# Patient Record
Sex: FEMALE | Race: WHITE | NOT HISPANIC OR LATINO | Employment: OTHER | URBAN - METROPOLITAN AREA
[De-identification: names, ages, dates, MRNs, and addresses within clinical notes are randomized per-mention and may not be internally consistent; named-entity substitution may affect disease eponyms.]

---

## 2019-10-03 NOTE — PATIENT DISCUSSION
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 This can distort vision. The fluid leakage comes from a layer of tissue under the retina, called the choroid. The layer of cells between retina and choroid is called the retinal pigment epithelium (RPE). When RPE does not work as it should, fluid builds up under the retina or the RPE resulting in a small detachment and visual distortion.

## 2019-11-14 NOTE — PATIENT DISCUSSION
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

## 2020-08-20 ENCOUNTER — NEW PATIENT COMPREHENSIVE (OUTPATIENT)
Dept: URBAN - METROPOLITAN AREA CLINIC 32 | Facility: CLINIC | Age: 85
End: 2020-08-20

## 2020-08-20 DIAGNOSIS — H33.011: ICD-10-CM

## 2020-08-20 DIAGNOSIS — H40.023: ICD-10-CM

## 2020-08-20 PROCEDURE — 92004 COMPRE OPH EXAM NEW PT 1/>: CPT

## 2020-08-20 PROCEDURE — 76514 ECHO EXAM OF EYE THICKNESS: CPT

## 2020-08-20 PROCEDURE — 92133 CPTRZD OPH DX IMG PST SGM ON: CPT

## 2020-08-20 PROCEDURE — 92015 DETERMINE REFRACTIVE STATE: CPT

## 2020-08-20 ASSESSMENT — KERATOMETRY
OD_K1POWER_DIOPTERS: 43.25
OS_K2POWER_DIOPTERS: 44.25
OD_K2POWER_DIOPTERS: 44.75
OD_AXISANGLE2_DEGREES: 3
OS_AXISANGLE_DEGREES: 65
OS_K1POWER_DIOPTERS: 43.5
OS_AXISANGLE2_DEGREES: 155
OD_AXISANGLE_DEGREES: 93

## 2020-08-20 ASSESSMENT — VISUAL ACUITY
OS_SC: 20/40
OS_PH: 20/20-2
OD_SC: 20/70
OD_PH: 20/25-1
OS_SC: J2
OD_SC: J1-1

## 2020-08-20 ASSESSMENT — PACHYMETRY
OS_CT_UM: 516
OD_CT_UM: 516

## 2020-08-20 ASSESSMENT — TONOMETRY
OS_IOP_MMHG: 15
OD_IOP_MMHG: 19

## 2020-09-15 NOTE — PATIENT DISCUSSION
We reviewed the signs and symptoms of retinal tear/retinal detachment and the importance of prompt evaluation should there be increasing floaters, new flashing lights, or decreasing peripheral vision in either eye at any time. Walk in [employer]

## 2020-10-14 ENCOUNTER — EST. PATIENT EMERGENCY (OUTPATIENT)
Dept: URBAN - METROPOLITAN AREA CLINIC 32 | Facility: CLINIC | Age: 85
End: 2020-10-14

## 2020-10-14 DIAGNOSIS — H33.011: ICD-10-CM

## 2020-10-14 DIAGNOSIS — H40.023: ICD-10-CM

## 2020-10-14 PROCEDURE — 92012 INTRM OPH EXAM EST PATIENT: CPT

## 2020-10-14 ASSESSMENT — TONOMETRY
OS_IOP_MMHG: 21
OD_IOP_MMHG: 16

## 2020-10-14 ASSESSMENT — KERATOMETRY
OS_K2POWER_DIOPTERS: 44.25
OD_AXISANGLE2_DEGREES: 3
OD_K1POWER_DIOPTERS: 43.25
OD_AXISANGLE_DEGREES: 93
OS_AXISANGLE2_DEGREES: 155
OS_AXISANGLE_DEGREES: 65
OD_K2POWER_DIOPTERS: 44.75
OS_K1POWER_DIOPTERS: 43.5

## 2020-10-14 ASSESSMENT — VISUAL ACUITY
OS_SC: 20/25-2
OD_SC: 20/80-2

## 2020-10-15 ENCOUNTER — ESTABLISHED PATIENT (OUTPATIENT)
Dept: URBAN - METROPOLITAN AREA CLINIC 32 | Facility: CLINIC | Age: 85
End: 2020-10-15

## 2020-10-15 DIAGNOSIS — H52.203: ICD-10-CM

## 2020-10-15 DIAGNOSIS — H33.011: ICD-10-CM

## 2020-10-15 DIAGNOSIS — H52.4: ICD-10-CM

## 2020-10-15 DIAGNOSIS — H52.03: ICD-10-CM

## 2020-10-15 PROCEDURE — 92015 DETERMINE REFRACTIVE STATE: CPT

## 2020-10-15 PROCEDURE — 92012 INTRM OPH EXAM EST PATIENT: CPT

## 2020-10-15 PROCEDURE — 92134 CPTRZ OPH DX IMG PST SGM RTA: CPT

## 2020-10-15 ASSESSMENT — KERATOMETRY
OS_AXISANGLE2_DEGREES: 155
OS_K2POWER_DIOPTERS: 44.25
OS_K1POWER_DIOPTERS: 43.5
OD_AXISANGLE_DEGREES: 93
OD_AXISANGLE2_DEGREES: 3
OD_K1POWER_DIOPTERS: 43.25
OS_AXISANGLE_DEGREES: 65
OD_K2POWER_DIOPTERS: 44.75

## 2020-10-15 ASSESSMENT — TONOMETRY
OD_IOP_MMHG: 16
OS_IOP_MMHG: 17

## 2020-10-15 ASSESSMENT — VISUAL ACUITY
OD_SC: 20/60
OS_SC: 20/25-2

## 2020-11-02 NOTE — PATIENT DISCUSSION
COVID-19 Pre-surgery screenin. Do you have an undiagnosed respiratory illness or symptoms such as coughing or sneezing? No   a. Onset of Sx No  b. Acute vs. chronic respiratory illness No    2. Do you have an unexplained fever greater than 100.4 degrees Fahrenheit or 38 degrees Celsius?     No     3. Have you had direct exposure to a patient who tested positive for Covid-19?    No     4. Have you had any loss of your sense of taste or smell? Have you had N/V or sore throat? No    Patient has been informed of visitor policy and asked to wear a mask upon entering the hospital   Yes        Pt edu no scarring seen on exam from any previous  so likely this is first occurrence. Risk factors include the use of steroids, sleep disturbances, increased Stress etc. Pt states he is currently receiving Steroid injections for his back, he is also an avid  and does get stressed out from [place] and stays up late. Edu pt this will likely improve on its own, recommend getting adequate sleep, reducing stress and reduce the use of Steroids/nasal sprays. Observation recommended at this point. Will follow up in 6 weeks. Advised pt to call with any vision changes.

## 2020-12-07 ENCOUNTER — EST. PATIENT EMERGENCY (OUTPATIENT)
Dept: URBAN - METROPOLITAN AREA CLINIC 32 | Facility: CLINIC | Age: 85
End: 2020-12-07

## 2020-12-07 DIAGNOSIS — H02.403: ICD-10-CM

## 2020-12-07 DIAGNOSIS — H02.835: ICD-10-CM

## 2020-12-07 DIAGNOSIS — H02.832: ICD-10-CM

## 2020-12-07 PROCEDURE — 92014 COMPRE OPH EXAM EST PT 1/>: CPT

## 2020-12-07 ASSESSMENT — KERATOMETRY
OD_K2POWER_DIOPTERS: 44.75
OD_AXISANGLE_DEGREES: 93
OD_K1POWER_DIOPTERS: 43.25
OS_AXISANGLE2_DEGREES: 155
OS_K2POWER_DIOPTERS: 44.25
OD_AXISANGLE2_DEGREES: 3
OS_K1POWER_DIOPTERS: 43.5
OS_AXISANGLE_DEGREES: 65

## 2020-12-07 ASSESSMENT — VISUAL ACUITY
OS_SC: 20/25
OD_SC: 20/400

## 2020-12-07 ASSESSMENT — TONOMETRY
OS_IOP_MMHG: 18
OD_IOP_MMHG: 14

## 2020-12-08 ENCOUNTER — NEW REFERRAL (OUTPATIENT)
Dept: URBAN - METROPOLITAN AREA CLINIC 33 | Facility: CLINIC | Age: 85
End: 2020-12-08

## 2020-12-08 VITALS
WEIGHT: 137 LBS | BODY MASS INDEX: 22.82 KG/M2 | HEART RATE: 46 BPM | HEIGHT: 65 IN | SYSTOLIC BLOOD PRESSURE: 130 MMHG | DIASTOLIC BLOOD PRESSURE: 82 MMHG

## 2020-12-08 DIAGNOSIS — H43.813: ICD-10-CM

## 2020-12-08 DIAGNOSIS — H33.41: ICD-10-CM

## 2020-12-08 PROCEDURE — 99204 OFFICE O/P NEW MOD 45 MIN: CPT

## 2020-12-08 PROCEDURE — 92250 FUNDUS PHOTOGRAPHY W/I&R: CPT

## 2020-12-08 ASSESSMENT — VISUAL ACUITY
OD_SC: CF 2FT
OS_SC: 20/25-2

## 2020-12-08 ASSESSMENT — TONOMETRY
OS_IOP_MMHG: 13
OD_IOP_MMHG: 12

## 2021-02-16 ENCOUNTER — FOLLOW UP (OUTPATIENT)
Dept: URBAN - METROPOLITAN AREA CLINIC 32 | Facility: CLINIC | Age: 86
End: 2021-02-16

## 2021-02-16 DIAGNOSIS — H02.835: ICD-10-CM

## 2021-02-16 DIAGNOSIS — H02.832: ICD-10-CM

## 2021-02-16 DIAGNOSIS — H02.403: ICD-10-CM

## 2021-02-16 PROCEDURE — 92012 INTRM OPH EXAM EST PATIENT: CPT

## 2021-02-16 ASSESSMENT — KERATOMETRY
OD_AXISANGLE2_DEGREES: 3
OD_K2POWER_DIOPTERS: 44.75
OS_AXISANGLE2_DEGREES: 155
OS_AXISANGLE_DEGREES: 65
OD_AXISANGLE_DEGREES: 93
OS_K2POWER_DIOPTERS: 44.25
OS_K1POWER_DIOPTERS: 43.5
OD_K1POWER_DIOPTERS: 43.25

## 2021-02-16 ASSESSMENT — TONOMETRY
OD_IOP_MMHG: 16
OS_IOP_MMHG: 19

## 2021-02-16 ASSESSMENT — VISUAL ACUITY
OD_SC: 20/400
OS_SC: 20/40-3
OS_PH: 20/25-2
OS_CC: J2
OD_CC: 20/400

## 2022-06-04 ENCOUNTER — TELEPHONE ENCOUNTER (OUTPATIENT)
Dept: URBAN - METROPOLITAN AREA CLINIC 68 | Facility: CLINIC | Age: 87
End: 2022-06-04

## 2022-06-05 ENCOUNTER — TELEPHONE ENCOUNTER (OUTPATIENT)
Dept: URBAN - METROPOLITAN AREA CLINIC 68 | Facility: CLINIC | Age: 87
End: 2022-06-05

## 2022-06-25 ENCOUNTER — TELEPHONE ENCOUNTER (OUTPATIENT)
Age: 87
End: 2022-06-25

## 2022-06-26 ENCOUNTER — TELEPHONE ENCOUNTER (OUTPATIENT)
Age: 87
End: 2022-06-26

## 2022-12-27 NOTE — PROCEDURE NOTE: CLINICAL
PROCEDURE NOTE: Focal Laser, Retina OS. Diagnosis: Central Serous Retinopathy. Anesthesia: Topical. Prior to focal laser, risks/benefits/alternatives to laser discussed including loss of vision and patient wished to proceed. Spot size: 140 um. Power: 300mW. Number of pulses: 240. Patient tolerated procedure well. There were no complications. Post procedure instructions given. David Goodwin

## 2022-12-27 NOTE — PATIENT DISCUSSION
An examination that was significantly and separately identifiable from the procedure performed today was also completed for dry eeys.

## 2023-02-06 NOTE — PATIENT DISCUSSION
Despite some risk factors, the patient does not demonstrate definitive evidence of glaucoma at this time. Nutrition Services Brief Update:    Problem: Nutritional:  Goal: Nutrition support of MBM tolerated and meeting greater than 85% of estimated needs  Outcome: Met    Per flowsheets and RN, tube feeds are now at goal rate of 31 ml/hr of MBM. Per chart review, Bun is 2 and infant would benefit from increased protein provision. Per RN, labs will be drawn tomorrow and will re-assess with updated Bun. If Bun remains <10, infant would benefit from addition of human milk fortifier with MBM for increased protein.     RD monitoring

## 2023-06-15 NOTE — PATIENT DISCUSSION
Cataract symptoms i.e., glare, blur discussed. Pt to call if worsening vision or trouble with driving, TV, reading, ADL. UV precautions. Reviewed possibility of future cataract surgery. Health Maintenance Due   Topic Date Due   • Hepatitis B Vaccine (1 of 3 - 3-dose series) Never done   • Colorectal Cancer Screen-  Never done       Patient is due for topics as listed above but is not proceeding with Immunization(s) Hep B at this time.